# Patient Record
Sex: FEMALE | Race: WHITE
[De-identification: names, ages, dates, MRNs, and addresses within clinical notes are randomized per-mention and may not be internally consistent; named-entity substitution may affect disease eponyms.]

---

## 2018-06-02 ENCOUNTER — HOSPITAL ENCOUNTER (EMERGENCY)
Dept: HOSPITAL 56 - MW.ED | Age: 53
Discharge: HOME | End: 2018-06-02
Payer: COMMERCIAL
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
## 2018-06-02 DIAGNOSIS — S16.1XXA: Primary | ICD-10-CM
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
## 2018-06-02 PROCEDURE — 99284 EMERGENCY DEPT VISIT MOD MDM: CPT

## 2018-06-02 PROCEDURE — 72040 X-RAY EXAM NECK SPINE 2-3 VW: CPT
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
